# Patient Record
Sex: MALE | URBAN - METROPOLITAN AREA
[De-identification: names, ages, dates, MRNs, and addresses within clinical notes are randomized per-mention and may not be internally consistent; named-entity substitution may affect disease eponyms.]

---

## 2021-04-20 ENCOUNTER — OFFICE VISIT (OUTPATIENT)
Dept: URBAN - METROPOLITAN AREA CLINIC 30 | Facility: CLINIC | Age: 71
End: 2021-04-20
Payer: MEDICARE

## 2021-04-20 DIAGNOSIS — H02.831 DERMATOCHALASIS OF RIGHT UPPER EYELID: Primary | ICD-10-CM

## 2021-04-20 PROCEDURE — 99204 OFFICE O/P NEW MOD 45 MIN: CPT | Performed by: OPHTHALMOLOGY

## 2021-04-20 PROCEDURE — 92081 LIMITED VISUAL FIELD XM: CPT | Performed by: OPHTHALMOLOGY

## 2021-04-20 PROCEDURE — 92285 EXTERNAL OCULAR PHOTOGRAPHY: CPT | Performed by: OPHTHALMOLOGY

## 2021-04-20 RX ORDER — NEOMYCIN SULFATE, POLYMYXIN B SULFATE AND DEXAMETHASONE 3.5; 10000; 1 MG/G; [USP'U]/G; MG/G
OINTMENT OPHTHALMIC
Qty: 2 | Refills: 1 | Status: ACTIVE
Start: 2021-04-20

## 2021-04-20 NOTE — IMPRESSION/PLAN
Impression: Dermatochalasis of left upper eyelid: H02.834. Plan: Visual Fields & photos were performed and interpreted today and demonstrated restriction of superior and temporal visual fields. This reverted to normal, demonstrating >30% improvement in visual field with mechanical elevation of the eyelid and brow. To correct the loss of superior and temporal VF caused by redundant upper eyelid skin and muscle, I recommended performing an upper lid blepharoplasty. Discussed the R/B/A of this procedure, all questions were answered. Visual field shows restriction of superior and temporal visual fields in resting position. With eyelids/brows elevated/taped there is a significant (>30%) improvement in the superior and temporal visual fields.

## 2021-04-20 NOTE — IMPRESSION/PLAN
Impression: Dermatochalasis of right upper eyelid: H02.831. Plan: Visual Fields & photos were performed and interpreted today and demonstrated restriction of superior and temporal visual fields. This reverted to normal, demonstrating >30% improvement in visual field with mechanical elevation of the eyelid and brow. To correct the loss of superior and temporal VF caused by redundant upper eyelid skin and muscle, I recommended performing an upper lid blepharoplasty. Discussed the R/B/A of this procedure, all questions were answered. Visual field shows restriction of superior and temporal visual fields in resting position. With eyelids/brows elevated/taped there is a significant (>30%) improvement in the superior and temporal visual fields.

## 2021-04-27 ENCOUNTER — ADULT PHYSICAL (OUTPATIENT)
Dept: URBAN - METROPOLITAN AREA CLINIC 30 | Facility: CLINIC | Age: 71
End: 2021-04-27
Payer: MEDICARE

## 2021-04-27 DIAGNOSIS — H02.834 DERMATOCHALASIS OF LEFT UPPER EYELID: ICD-10-CM

## 2021-04-27 DIAGNOSIS — Z01.818 ENCOUNTER FOR OTHER PREPROCEDURAL EXAMINATION: Primary | ICD-10-CM

## 2021-04-27 PROCEDURE — 99203 OFFICE O/P NEW LOW 30 MIN: CPT | Performed by: PHYSICIAN ASSISTANT

## 2021-04-27 RX ORDER — LISINOPRIL 40 MG/1
40 MG TABLET ORAL
Qty: 0 | Refills: 0 | Status: ACTIVE
Start: 2021-04-27

## 2021-04-27 RX ORDER — SIMVASTATIN 20 MG/1
20 MG TABLET, FILM COATED ORAL
Qty: 0 | Refills: 0 | Status: ACTIVE
Start: 2021-04-27

## 2021-05-14 ENCOUNTER — SURGERY (OUTPATIENT)
Dept: URBAN - METROPOLITAN AREA SURGERY 12 | Facility: SURGERY | Age: 71
End: 2021-05-14
Payer: MEDICARE

## 2021-05-24 ENCOUNTER — POST-OPERATIVE VISIT (OUTPATIENT)
Dept: URBAN - METROPOLITAN AREA CLINIC 30 | Facility: CLINIC | Age: 71
End: 2021-05-24
Payer: MEDICARE

## 2021-05-24 DIAGNOSIS — Z48.89 ENCOUNTER FOR OTHER SPECIFIED SURGICAL AFTERCARE: Primary | ICD-10-CM

## 2021-05-24 PROCEDURE — 99024 POSTOP FOLLOW-UP VISIT: CPT | Performed by: OPTOMETRIST

## 2021-05-24 ASSESSMENT — INTRAOCULAR PRESSURE
OS: 20
OD: 24

## 2021-05-24 ASSESSMENT — KERATOMETRY
OS: 44.58
OD: 43.35

## 2021-05-24 NOTE — IMPRESSION/PLAN
Impression: S/P Blepharoplasty- Both upper lids OU - 10 Days. Encounter for other specified surgical aftercare  Z48.89. Post operative instructions reviewed - Plan: Removed sutures today in office.  RTC 3-6 months FU